# Patient Record
Sex: FEMALE | Race: WHITE | NOT HISPANIC OR LATINO | Employment: FULL TIME | URBAN - METROPOLITAN AREA
[De-identification: names, ages, dates, MRNs, and addresses within clinical notes are randomized per-mention and may not be internally consistent; named-entity substitution may affect disease eponyms.]

---
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
## 2020-01-03 ENCOUNTER — TELEPHONE (OUTPATIENT)
Dept: UROLOGY | Facility: CLINIC | Age: 20
End: 2020-01-03

## 2020-01-03 NOTE — TELEPHONE ENCOUNTER
----- Message from Tai Bonilla sent at 1/3/2020  9:16 AM CST -----  Contact: Pt  Please call Sukhdev regarding her condition and if she needs to schedule a surgery. [phone].

## 2020-01-03 NOTE — TELEPHONE ENCOUNTER
----- Message from Maame Presley sent at 1/3/2020  9:46 AM CST -----  Contact: Patient   Patient called in regards to check the status on a test that needs to be done to full her bladder , patient stated the office was supposed to check with the medicaid office to see if it has been approved and she never received and  Call back . Please call back at [phone].      Thanks,  Maame Presley

## 2020-01-14 ENCOUNTER — CLINICAL SUPPORT (OUTPATIENT)
Dept: UROLOGY | Facility: CLINIC | Age: 20
End: 2020-01-14
Payer: MEDICAID

## 2020-01-14 VITALS
DIASTOLIC BLOOD PRESSURE: 72 MMHG | OXYGEN SATURATION: 98 % | SYSTOLIC BLOOD PRESSURE: 112 MMHG | RESPIRATION RATE: 18 BRPM | TEMPERATURE: 98 F

## 2020-01-14 DIAGNOSIS — N30.10 INTERSTITIAL CYSTITIS: Primary | ICD-10-CM

## 2020-01-14 NOTE — PROGRESS NOTES
Treatment # 6 cleansed urethra with hibiclense inserted 14 Croatian red catheter int patients bladder and drained the bladder, step #1 instilled (slow push), catheter held in place with plug. Patient was instructed to hold and rotate for 30 minutes. After draining step #1 from bladder step #2 was instilled ( slow push ). Patient was instructed to hold for 30 minutes or longer, catheter was discontinued.  Pt tolerated procedure without complaints. Pt verbalized understanding of post instructions.

## 2020-01-21 ENCOUNTER — CLINICAL SUPPORT (OUTPATIENT)
Dept: UROLOGY | Facility: CLINIC | Age: 20
End: 2020-01-21
Payer: MEDICAID

## 2020-01-21 VITALS
SYSTOLIC BLOOD PRESSURE: 115 MMHG | HEART RATE: 66 BPM | TEMPERATURE: 98 F | DIASTOLIC BLOOD PRESSURE: 63 MMHG | OXYGEN SATURATION: 98 % | RESPIRATION RATE: 18 BRPM

## 2020-01-21 DIAGNOSIS — N30.10 INTERSTITIAL CYSTITIS: Primary | ICD-10-CM

## 2020-01-21 NOTE — PROGRESS NOTES
Treatment # 2 cleansed urethra with hibiclense inserted 14 Barbadian red catheter int patients bladder and drained the bladder, step #1 instilled (slow push), catheter held in place with plug. Patient was instructed to hold and rotate for 30 minutes. After draining step #1 from bladder step #2 was instilled ( slow push ). Patient was instructed to hold for 30 minutes or longer, catheter was discontinued.  Pt tolerated procedure without complaints. Pt verbalized understanding of post instructions.

## 2020-01-28 ENCOUNTER — CLINICAL SUPPORT (OUTPATIENT)
Dept: UROLOGY | Facility: CLINIC | Age: 20
End: 2020-01-28
Payer: MEDICAID

## 2020-01-28 VITALS
HEART RATE: 68 BPM | OXYGEN SATURATION: 98 % | RESPIRATION RATE: 18 BRPM | TEMPERATURE: 99 F | DIASTOLIC BLOOD PRESSURE: 56 MMHG | SYSTOLIC BLOOD PRESSURE: 108 MMHG

## 2020-01-28 DIAGNOSIS — N30.10 INTERSTITIAL CYSTITIS: Primary | ICD-10-CM

## 2020-01-28 PROCEDURE — 51700 PR IRRIGATION, BLADDER: ICD-10-PCS | Mod: S$GLB,,, | Performed by: UROLOGY

## 2020-01-28 PROCEDURE — 51700 IRRIGATION OF BLADDER: CPT | Mod: S$GLB,,, | Performed by: UROLOGY

## 2020-01-28 NOTE — PROGRESS NOTES
Treatment # 3 cleansed urethra with hibiclense inserted 14 Ivorian red catheter int patients bladder and drained the bladder, step #1 instilled (slow push), catheter held in place with plug. Patient was instructed to hold and rotate for 30 minutes. After draining step #1 from bladder step #2 was instilled ( slow push ). Patient was instructed to hold for 30 minutes or longer, catheter was discontinued.  Pt tolerated procedure without complaints. Pt verbalized understanding of post instructions.

## 2020-02-04 ENCOUNTER — CLINICAL SUPPORT (OUTPATIENT)
Dept: UROLOGY | Facility: CLINIC | Age: 20
End: 2020-02-04
Payer: MEDICAID

## 2020-02-04 VITALS
HEART RATE: 88 BPM | WEIGHT: 114 LBS | BODY MASS INDEX: 20.98 KG/M2 | TEMPERATURE: 99 F | OXYGEN SATURATION: 98 % | HEIGHT: 62 IN | RESPIRATION RATE: 18 BRPM | DIASTOLIC BLOOD PRESSURE: 65 MMHG | SYSTOLIC BLOOD PRESSURE: 124 MMHG

## 2020-02-04 DIAGNOSIS — N30.10 INTERSTITIAL CYSTITIS: Primary | ICD-10-CM

## 2020-02-04 PROCEDURE — 51700 PR IRRIGATION, BLADDER: ICD-10-PCS | Mod: S$GLB,,, | Performed by: UROLOGY

## 2020-02-04 PROCEDURE — 51700 IRRIGATION OF BLADDER: CPT | Mod: S$GLB,,, | Performed by: UROLOGY

## 2020-02-04 NOTE — PROGRESS NOTES
Treatment # 4 cleansed urethra with hibiclense inserted 14 [place] red catheter int patients bladder and drained the bladder, step #1 instilled (slow push), catheter held in place with plug. Patient was instructed to hold and rotate for 30 minutes. After draining step #1 from bladder step #2 was instilled ( slow push ). Patient was instructed to hold for 30 minutes or longer, catheter was discontinued.  Pt tolerated procedure without complaints. Pt verbalized understanding of post instructions.

## 2020-02-11 ENCOUNTER — CLINICAL SUPPORT (OUTPATIENT)
Dept: UROLOGY | Facility: CLINIC | Age: 20
End: 2020-02-11
Payer: MEDICAID

## 2020-02-11 VITALS
OXYGEN SATURATION: 100 % | RESPIRATION RATE: 16 BRPM | TEMPERATURE: 98 F | DIASTOLIC BLOOD PRESSURE: 67 MMHG | SYSTOLIC BLOOD PRESSURE: 107 MMHG | HEART RATE: 63 BPM

## 2020-02-11 DIAGNOSIS — N30.10 INTERSTITIAL CYSTITIS: Primary | ICD-10-CM

## 2020-02-11 PROCEDURE — 51700 PR IRRIGATION, BLADDER: ICD-10-PCS | Mod: S$GLB,,, | Performed by: UROLOGY

## 2020-02-11 PROCEDURE — 51700 IRRIGATION OF BLADDER: CPT | Mod: S$GLB,,, | Performed by: UROLOGY

## 2020-02-11 NOTE — PROGRESS NOTES
Treatment # 5 cleansed urethra with hibiclense inserted 14 Tongan red catheter int patients bladder and drained the bladder, step #1 instilled (slow push), catheter held in place with plug. Patient was instructed to hold and rotate for 30 minutes. After draining step #1 from bladder step #2 was instilled ( slow push ). Patient was instructed to hold for 30 minutes or longer, catheter was discontinued.  Pt tolerated procedure without complaints. Pt verbalized understanding of post instructions.

## 2020-02-18 ENCOUNTER — CLINICAL SUPPORT (OUTPATIENT)
Dept: UROLOGY | Facility: CLINIC | Age: 20
End: 2020-02-18
Payer: MEDICAID

## 2020-02-18 VITALS
HEART RATE: 82 BPM | BODY MASS INDEX: 20.2 KG/M2 | SYSTOLIC BLOOD PRESSURE: 113 MMHG | RESPIRATION RATE: 18 BRPM | HEIGHT: 63 IN | WEIGHT: 114 LBS | DIASTOLIC BLOOD PRESSURE: 77 MMHG

## 2020-02-18 DIAGNOSIS — R10.2 PELVIC PAIN: Primary | ICD-10-CM

## 2020-02-18 LAB
BILIRUB UR QL STRIP: NEGATIVE
GLUCOSE UR QL STRIP: NEGATIVE
KETONES UR QL STRIP: POSITIVE
LEUKOCYTE ESTERASE UR QL STRIP: NEGATIVE
PH, POC UA: 6
POC AMORP, URINE: ABNORMAL
POC BACTI, URINE: ABNORMAL
POC BLOOD, URINE: POSITIVE
POC CASTS, URINE: ABNORMAL
POC CRYST, URINE: ABNORMAL
POC EPITH, URINE: ABNORMAL
POC HCG, URINE: ABNORMAL
POC HYALIN, URINE: ABNORMAL
POC MUCUS, URINE: ABNORMAL
POC NITRATES, URINE: NEGATIVE
POC OTHER, URINE: ABNORMAL
POC RBC, URINE: ABNORMAL HPF
POC WBC, URINE: ABNORMAL HPF
PROT UR QL STRIP: POSITIVE
SP GR UR STRIP: 1.02 (ref 1–1.03)
UROBILINOGEN UR STRIP-ACNC: 0.2 (ref 0.1–1.1)

## 2020-02-18 NOTE — PROGRESS NOTES
Treatment # 6 cleansed urethra with hibiclense inserted 14 Surinamese red catheter int patients bladder and drained the bladder, step #1 instilled (slow push), catheter held in place with plug. Patient was instructed to hold treatment and rotate every 10 minutes for 30 minutes.Step #1 drained from bladder and step #2 was instilled ( slow push ). Catheter was discontinued. Patient was instructed to hold for 20 minutes, no longer.  Pt tolerated procedure without complaints. Pt verbalized understanding of post instructions.

## 2020-03-05 ENCOUNTER — OFFICE VISIT (OUTPATIENT)
Dept: UROLOGY | Facility: CLINIC | Age: 20
End: 2020-03-05
Payer: MEDICAID

## 2020-03-05 VITALS
DIASTOLIC BLOOD PRESSURE: 68 MMHG | RESPIRATION RATE: 16 BRPM | HEART RATE: 70 BPM | BODY MASS INDEX: 20.2 KG/M2 | SYSTOLIC BLOOD PRESSURE: 110 MMHG | HEIGHT: 63 IN | WEIGHT: 114 LBS

## 2020-03-05 DIAGNOSIS — N30.10 INTERSTITIAL CYSTITIS: Primary | ICD-10-CM

## 2020-03-05 LAB
BILIRUB UR QL STRIP: NEGATIVE
GLUCOSE UR QL STRIP: NEGATIVE
KETONES UR QL STRIP: POSITIVE
LEUKOCYTE ESTERASE UR QL STRIP: NEGATIVE
PH, POC UA: 7
POC AMORP, URINE: ABNORMAL
POC BACTI, URINE: ABNORMAL
POC BLOOD, URINE: POSITIVE
POC CASTS, URINE: ABNORMAL
POC CRYST, URINE: ABNORMAL
POC EPITH, URINE: ABNORMAL
POC HCG, URINE: ABNORMAL
POC HYALIN, URINE: ABNORMAL LPF
POC MUCUS, URINE: ABNORMAL
POC NITRATES, URINE: NEGATIVE
POC OTHER, URINE: ABNORMAL
POC RBC, URINE: ABNORMAL HPF
POC WBC, URINE: ABNORMAL HPF
PROT UR QL STRIP: POSITIVE
SP GR UR STRIP: 1.02 (ref 1–1.03)
UROBILINOGEN UR STRIP-ACNC: 1 (ref 0.1–1.1)

## 2020-03-05 PROCEDURE — 99213 OFFICE O/P EST LOW 20 MIN: CPT | Mod: S$GLB,,, | Performed by: NURSE PRACTITIONER

## 2020-03-05 PROCEDURE — 99213 PR OFFICE/OUTPT VISIT, EST, LEVL III, 20-29 MIN: ICD-10-PCS | Mod: S$GLB,,, | Performed by: NURSE PRACTITIONER

## 2020-03-05 RX ORDER — AMITRIPTYLINE HYDROCHLORIDE 25 MG/1
25 TABLET, FILM COATED ORAL NIGHTLY PRN
Qty: 30 TABLET | Refills: 0 | Status: SHIPPED | OUTPATIENT
Start: 2020-03-05 | End: 2020-04-04

## 2020-03-05 NOTE — PROGRESS NOTES
Subjective:       Patient ID: Sukhdev Lizarraga is a 19 y.o. female.    Chief Complaint: Interstitial Cystitis (F/U after 6th DMSO)      HPI: 19-year-old female, patient of Dr. Adames, presents for follow-up.  Patient has a history of interstitial cystitis.  She just completed DMSO treatments.  Patient is on IC 2 caps an IC diet.  Patient states she has had some improvement.  She still has pain and frequency and urgency..  However, she states that her episodes have decreased to approximately 1-2 times a day.  She states that she takes the IC 2 caps 3 to 4 times a day.  Patient states symptoms will be worse at night.  Patient states she was smoking marijuana, which did provide some relief.  However she stopped the marijuana because she is considering in-vitro fertilization.  Patient states she saw on the Internet that in in vitro fertilization can cure IC.  Patient states she also has an appointment with a rheumatologist, because she is having pain all over her body.    Patient denies difficulty voiding.  Denies fever.  Denies recent episodes of blood in her urine.  No other urinary complaints.  All other health problems are stable at this time.         Past Medical History:   Past Medical History:   Diagnosis Date    ADD (attention deficit disorder)     IC (interstitial cystitis)        Past Surgical Historical:   Past Surgical History:   Procedure Laterality Date    CYSTOSCOPY      CYSTO with BOTOX     CYSTOSCOPY  12/05/2019    CYSTO, HYDRODISTENTION, BLADDER BX    WISDOM TOOTH EXTRACTION          Medications:   Medication List with Changes/Refills   New Medications    AMITRIPTYLINE (ELAVIL) 25 MG TABLET    Take 1 tablet (25 mg total) by mouth nightly as needed for Insomnia.   Current Medications    ALBUTEROL (PROVENTIL/VENTOLIN HFA) 90 MCG/ACTUATION INHALER    Ventolin HFA 90 mcg/actuation aerosol inhaler    DEXTROAMPHETAMINE-AMPHETAMINE 30 MG TAB        EPINEPHRINE (EPIPEN) 0.3 MG/0.3 ML ATIN    epinephrine 0.3  mg/0.3 mL injection, auto-injector    PERMETHRIN (ELIMITE) 5 % CREAM            Past Social History:   Social History     Socioeconomic History    Marital status: Single     Spouse name: Not on file    Number of children: Not on file    Years of education: Not on file    Highest education level: Not on file   Occupational History    Not on file   Social Needs    Financial resource strain: Not on file    Food insecurity:     Worry: Not on file     Inability: Not on file    Transportation needs:     Medical: Not on file     Non-medical: Not on file   Tobacco Use    Smoking status: Never Smoker    Smokeless tobacco: Never Used   Substance and Sexual Activity    Alcohol use: Not Currently    Drug use: Yes     Types: Marijuana    Sexual activity: Yes     Partners: Male   Lifestyle    Physical activity:     Days per week: Not on file     Minutes per session: Not on file    Stress: Not on file   Relationships    Social connections:     Talks on phone: Not on file     Gets together: Not on file     Attends Mandaeism service: Not on file     Active member of club or organization: Not on file     Attends meetings of clubs or organizations: Not on file     Relationship status: Not on file   Other Topics Concern    Not on file   Social History Narrative    Not on file       Allergies:   Review of patient's allergies indicates:   Allergen Reactions    House dust mite     Naproxen      Other reaction(s): Naproxen        Family History:   Family History   Problem Relation Age of Onset    Diabetes Paternal Grandfather         Review of Systems:  Review of Systems   Constitutional: Negative for activity change and appetite change.   HENT: Negative for congestion and dental problem.    Respiratory: Negative for chest tightness and shortness of breath.    Cardiovascular: Negative for chest pain.   Gastrointestinal: Negative for abdominal distention.   Genitourinary: Positive for frequency and urgency. Negative for  "decreased urine volume, difficulty urinating, dyspareunia, dysuria, enuresis, flank pain, genital sores, hematuria and pelvic pain.   Musculoskeletal: Negative for back pain and neck pain.   Allergic/Immunologic: Negative for immunocompromised state.   Neurological: Negative for dizziness.   Hematological: Negative for adenopathy.   Psychiatric/Behavioral: Negative for agitation, behavioral problems and confusion.       Physical Exam:  Physical Exam   Nursing note and vitals reviewed.  Constitutional: She is oriented to person, place, and time. She appears well-developed and well-nourished.   HENT:   Head: Normocephalic.   Cardiovascular: Normal rate, regular rhythm and normal heart sounds.    Pulmonary/Chest: Effort normal and breath sounds normal.   Abdominal: Soft. Bowel sounds are normal.   Neurological: She is alert and oriented to person, place, and time.   Skin: Skin is warm and dry.      Urinalysis:  Moderate blood, red blood cells 15-20.  White blood cells 0-5, bacteria +1, epithelial +1, mucus +1, no leukocytes and no nitrates.    Assessment/Plan:   Interstitial cystitis:  Patient will continue IC diet and IC 2 caps.  I am also going to put the patient on Myrbetriq 25 mg, and Elavil 25 mg.  I informed the patient that there is no clinical data that shows pregnancy or childbirth will cure IC.    Patient still seems to be interested in in-vitro fertilization.  Patient states "you never know what will happen".  Patient encouraged to keep appointment with Rheumatology.  Will plan follow-up in 1 month for re-evaluation.  Sooner if needed.  Problem List Items Addressed This Visit     None      Visit Diagnoses     Interstitial cystitis    -  Primary    Relevant Orders    POCT Urinalysis w/ Microscope             "

## 2020-03-11 ENCOUNTER — OFFICE VISIT (OUTPATIENT)
Dept: OBSTETRICS AND GYNECOLOGY | Facility: CLINIC | Age: 20
End: 2020-03-11
Payer: MEDICAID

## 2020-03-11 VITALS
HEART RATE: 79 BPM | WEIGHT: 109.81 LBS | DIASTOLIC BLOOD PRESSURE: 83 MMHG | BODY MASS INDEX: 19.46 KG/M2 | HEIGHT: 63 IN | SYSTOLIC BLOOD PRESSURE: 118 MMHG

## 2020-03-11 DIAGNOSIS — N30.10 IC (INTERSTITIAL CYSTITIS): ICD-10-CM

## 2020-03-11 DIAGNOSIS — Z01.419 WOMEN'S ANNUAL ROUTINE GYNECOLOGICAL EXAMINATION: Primary | ICD-10-CM

## 2020-03-11 DIAGNOSIS — Z11.3 SCREENING FOR VENEREAL DISEASE (VD): ICD-10-CM

## 2020-03-11 PROCEDURE — 99395 PR PREVENTIVE VISIT,EST,18-39: ICD-10-PCS | Mod: S$GLB,,, | Performed by: OBSTETRICS & GYNECOLOGY

## 2020-03-11 PROCEDURE — 99395 PREV VISIT EST AGE 18-39: CPT | Mod: S$GLB,,, | Performed by: OBSTETRICS & GYNECOLOGY

## 2020-03-11 NOTE — PROGRESS NOTES
Subjective:       Patient ID: Sukhdev Lizarraga is a 19 y.o. female.    Chief Complaint:  Follow-up      History of Present Illness  Pt here for gyn annual.  History and past labs reviewed with patient.    Complaints none. Wants to have IVF so she can cure her IC.      Review of Systems  Review of Systems   Constitutional: Negative for chills and fever.   Respiratory: Negative for shortness of breath.    Cardiovascular: Negative for chest pain.   Gastrointestinal: Negative for abdominal pain, blood in stool, constipation, diarrhea, nausea, vomiting and reflux.   Genitourinary: Positive for pelvic pain. Negative for dysmenorrhea, dyspareunia, dysuria, hematuria, hot flashes, menorrhagia, menstrual problem, vaginal bleeding, vaginal discharge, postcoital bleeding and vaginal dryness.   Musculoskeletal: Negative for arthralgias and joint swelling.   Integumentary:  Negative for rash, hair changes, breast mass, nipple discharge and breast skin changes.   Psychiatric/Behavioral: Negative for depression. The patient is not nervous/anxious.    Breast: Negative for asymmetry, lump, mass, nipple discharge and skin changes          Objective:    Physical Exam:   Constitutional: She appears well-developed and well-nourished. No distress.    HENT:   Head: Normocephalic and atraumatic.    Eyes: Conjunctivae and EOM are normal.    Neck: No tracheal deviation present. No thyromegaly present.    Cardiovascular: Exam reveals no clubbing, no cyanosis and no edema.     Pulmonary/Chest: Effort normal. No respiratory distress.        Abdominal: Soft. She exhibits no distension and no mass. There is no tenderness. There is no rebound and no guarding. No hernia.     Genitourinary: Rectum normal, vagina normal and uterus normal. Pelvic exam was performed with patient supine. There is no rash, tenderness, lesion or injury on the right labia. There is no rash, tenderness, lesion or injury on the left labia. Cervix is normal. Right adnexum  displays no mass, no tenderness and no fullness. Left adnexum displays no mass, no tenderness and no fullness.                Skin: She is not diaphoretic. No cyanosis. Nails show no clubbing.            Assessment:        1. Screening for venereal disease (VD)    2. IC (interstitial cystitis)                Plan:      Annual   STD panel   Flu shot declined   Do not feel as though pregnancy will help her IC  RTC  1 year

## 2020-10-16 ENCOUNTER — TELEPHONE (OUTPATIENT)
Dept: OBSTETRICS AND GYNECOLOGY | Facility: CLINIC | Age: 20
End: 2020-10-16

## 2020-10-16 NOTE — TELEPHONE ENCOUNTER
----- Message from Gail Alvarado sent at 10/16/2020 10:05 AM CDT -----  Contact: self    ----- Message -----  From: Richard Wells  Sent: 10/16/2020   9:48 AM CDT  To: Jamie Fournier (Obgyn) Staff    Requesting call back regarding getting an appt to confirm regnancy. Also pt states she has a lump inside inner thigh under vaginal area that hurts. Please call back at [phone].

## 2021-02-24 ENCOUNTER — OFFICE VISIT (OUTPATIENT)
Dept: UROLOGY | Facility: CLINIC | Age: 21
End: 2021-02-24
Payer: MEDICAID

## 2021-02-24 VITALS — SYSTOLIC BLOOD PRESSURE: 118 MMHG | RESPIRATION RATE: 18 BRPM | DIASTOLIC BLOOD PRESSURE: 62 MMHG | HEART RATE: 64 BPM

## 2021-02-24 DIAGNOSIS — N30.10 INTERSTITIAL CYSTITIS: Primary | ICD-10-CM

## 2021-02-24 PROCEDURE — 99213 OFFICE O/P EST LOW 20 MIN: CPT | Mod: S$GLB,,, | Performed by: NURSE PRACTITIONER

## 2021-02-24 PROCEDURE — 99213 PR OFFICE/OUTPT VISIT, EST, LEVL III, 20-29 MIN: ICD-10-PCS | Mod: S$GLB,,, | Performed by: NURSE PRACTITIONER

## 2023-05-10 NOTE — PROGRESS NOTES
Patient educated, consents signed, pre admission paper given. Patient verbalized understanding.  
English